# Patient Record
Sex: FEMALE | Race: WHITE | NOT HISPANIC OR LATINO | Employment: OTHER | URBAN - METROPOLITAN AREA
[De-identification: names, ages, dates, MRNs, and addresses within clinical notes are randomized per-mention and may not be internally consistent; named-entity substitution may affect disease eponyms.]

---

## 2019-07-20 ENCOUNTER — APPOINTMENT (EMERGENCY)
Dept: RADIOLOGY | Facility: HOSPITAL | Age: 74
End: 2019-07-20
Payer: COMMERCIAL

## 2019-07-20 ENCOUNTER — APPOINTMENT (OUTPATIENT)
Dept: ULTRASOUND IMAGING | Facility: HOSPITAL | Age: 74
End: 2019-07-20
Payer: COMMERCIAL

## 2019-07-20 ENCOUNTER — HOSPITAL ENCOUNTER (OUTPATIENT)
Facility: HOSPITAL | Age: 74
Setting detail: OBSERVATION
Discharge: HOME | End: 2019-07-21
Attending: EMERGENCY MEDICINE | Admitting: EMERGENCY MEDICINE
Payer: COMMERCIAL

## 2019-07-20 ENCOUNTER — APPOINTMENT (OUTPATIENT)
Dept: CT IMAGING | Facility: HOSPITAL | Age: 74
End: 2019-07-20
Payer: COMMERCIAL

## 2019-07-20 DIAGNOSIS — R11.2 NAUSEA AND VOMITING: ICD-10-CM

## 2019-07-20 DIAGNOSIS — R10.13 EPIGASTRIC PAIN: ICD-10-CM

## 2019-07-20 DIAGNOSIS — R55 SYNCOPE: Primary | ICD-10-CM

## 2019-07-20 PROBLEM — R07.9 CHEST PAIN: Status: ACTIVE | Noted: 2019-07-20

## 2019-07-20 PROBLEM — E78.5 HYPERLIPIDEMIA: Status: ACTIVE | Noted: 2019-07-20

## 2019-07-20 PROBLEM — I25.10 CAD (CORONARY ARTERY DISEASE): Status: ACTIVE | Noted: 2019-07-20

## 2019-07-20 LAB
ALBUMIN SERPL BCP-MCNC: 3.6 G/DL (ref 3.5–5)
ALP SERPL-CCNC: 97 U/L (ref 46–116)
ALT SERPL W P-5'-P-CCNC: 82 U/L (ref 12–78)
ANION GAP SERPL CALCULATED.3IONS-SCNC: 8 MMOL/L (ref 4–13)
AST SERPL W P-5'-P-CCNC: 43 U/L (ref 5–45)
BASOPHILS # BLD AUTO: 0.03 THOUSANDS/ΜL (ref 0–0.1)
BASOPHILS NFR BLD AUTO: 0 % (ref 0–1)
BILIRUB SERPL-MCNC: 0.4 MG/DL (ref 0.2–1)
BUN SERPL-MCNC: 11 MG/DL (ref 5–25)
CALCIUM SERPL-MCNC: 8.7 MG/DL (ref 8.3–10.1)
CHLORIDE SERPL-SCNC: 105 MMOL/L (ref 100–108)
CO2 SERPL-SCNC: 28 MMOL/L (ref 21–32)
CREAT SERPL-MCNC: 0.84 MG/DL (ref 0.6–1.3)
EOSINOPHIL # BLD AUTO: 0.34 THOUSAND/ΜL (ref 0–0.61)
EOSINOPHIL NFR BLD AUTO: 3 % (ref 0–6)
ERYTHROCYTE [DISTWIDTH] IN BLOOD BY AUTOMATED COUNT: 12.7 % (ref 11.6–15.1)
GFR SERPL CREATININE-BSD FRML MDRD: 70 ML/MIN/1.73SQ M
GLUCOSE SERPL-MCNC: 133 MG/DL (ref 65–140)
HCT VFR BLD AUTO: 42.1 % (ref 34.8–46.1)
HGB BLD-MCNC: 13.6 G/DL (ref 11.5–15.4)
IMM GRANULOCYTES # BLD AUTO: 0.04 THOUSAND/UL (ref 0–0.2)
IMM GRANULOCYTES NFR BLD AUTO: 0 % (ref 0–2)
LIPASE SERPL-CCNC: 330 U/L (ref 73–393)
LYMPHOCYTES # BLD AUTO: 1.06 THOUSANDS/ΜL (ref 0.6–4.47)
LYMPHOCYTES NFR BLD AUTO: 10 % (ref 14–44)
MCH RBC QN AUTO: 30.2 PG (ref 26.8–34.3)
MCHC RBC AUTO-ENTMCNC: 32.3 G/DL (ref 31.4–37.4)
MCV RBC AUTO: 94 FL (ref 82–98)
MONOCYTES # BLD AUTO: 0.71 THOUSAND/ΜL (ref 0.17–1.22)
MONOCYTES NFR BLD AUTO: 7 % (ref 4–12)
NEUTROPHILS # BLD AUTO: 8.67 THOUSANDS/ΜL (ref 1.85–7.62)
NEUTS SEG NFR BLD AUTO: 80 % (ref 43–75)
NRBC BLD AUTO-RTO: 0 /100 WBCS
PLATELET # BLD AUTO: 234 THOUSANDS/UL (ref 149–390)
PMV BLD AUTO: 10 FL (ref 8.9–12.7)
POTASSIUM SERPL-SCNC: 3.6 MMOL/L (ref 3.5–5.3)
PROT SERPL-MCNC: 7.2 G/DL (ref 6.4–8.2)
RBC # BLD AUTO: 4.5 MILLION/UL (ref 3.81–5.12)
SODIUM SERPL-SCNC: 141 MMOL/L (ref 136–145)
TROPONIN I SERPL-MCNC: <0.02 NG/ML
TSH SERPL DL<=0.05 MIU/L-ACNC: 2.57 UIU/ML (ref 0.36–3.74)
WBC # BLD AUTO: 10.85 THOUSAND/UL (ref 4.31–10.16)

## 2019-07-20 PROCEDURE — 80053 COMPREHEN METABOLIC PANEL: CPT

## 2019-07-20 PROCEDURE — 76705 ECHO EXAM OF ABDOMEN: CPT

## 2019-07-20 PROCEDURE — 85025 COMPLETE CBC W/AUTO DIFF WBC: CPT

## 2019-07-20 PROCEDURE — 99220 PR INITIAL OBSERVATION CARE/DAY 70 MINUTES: CPT | Performed by: INTERNAL MEDICINE

## 2019-07-20 PROCEDURE — 83690 ASSAY OF LIPASE: CPT | Performed by: EMERGENCY MEDICINE

## 2019-07-20 PROCEDURE — 93005 ELECTROCARDIOGRAM TRACING: CPT

## 2019-07-20 PROCEDURE — 71260 CT THORAX DX C+: CPT

## 2019-07-20 PROCEDURE — 84484 ASSAY OF TROPONIN QUANT: CPT | Performed by: PHYSICIAN ASSISTANT

## 2019-07-20 PROCEDURE — 99285 EMERGENCY DEPT VISIT HI MDM: CPT

## 2019-07-20 PROCEDURE — 71046 X-RAY EXAM CHEST 2 VIEWS: CPT

## 2019-07-20 PROCEDURE — 99284 EMERGENCY DEPT VISIT MOD MDM: CPT | Performed by: EMERGENCY MEDICINE

## 2019-07-20 PROCEDURE — 36415 COLL VENOUS BLD VENIPUNCTURE: CPT

## 2019-07-20 PROCEDURE — 84484 ASSAY OF TROPONIN QUANT: CPT

## 2019-07-20 PROCEDURE — 84443 ASSAY THYROID STIM HORMONE: CPT | Performed by: PHYSICIAN ASSISTANT

## 2019-07-20 RX ORDER — LEVOTHYROXINE SODIUM 0.1 MG/1
100 TABLET ORAL DAILY
Status: DISCONTINUED | OUTPATIENT
Start: 2019-07-20 | End: 2019-07-20

## 2019-07-20 RX ORDER — ACETAMINOPHEN 325 MG/1
650 TABLET ORAL EVERY 6 HOURS PRN
Status: DISCONTINUED | OUTPATIENT
Start: 2019-07-20 | End: 2019-07-21 | Stop reason: HOSPADM

## 2019-07-20 RX ORDER — SIMVASTATIN 40 MG
40 TABLET ORAL
COMMUNITY
End: 2021-04-18

## 2019-07-20 RX ORDER — ONDANSETRON 2 MG/ML
1 INJECTION INTRAMUSCULAR; INTRAVENOUS ONCE
Status: DISCONTINUED | OUTPATIENT
Start: 2019-07-20 | End: 2019-07-20

## 2019-07-20 RX ORDER — PANTOPRAZOLE SODIUM 40 MG/1
40 TABLET, DELAYED RELEASE ORAL
Status: DISCONTINUED | OUTPATIENT
Start: 2019-07-20 | End: 2019-07-21 | Stop reason: HOSPADM

## 2019-07-20 RX ORDER — ASPIRIN 81 MG/1
81 TABLET ORAL DAILY
Status: DISCONTINUED | OUTPATIENT
Start: 2019-07-20 | End: 2019-07-21 | Stop reason: HOSPADM

## 2019-07-20 RX ORDER — ASPIRIN 81 MG/1
81 TABLET, CHEWABLE ORAL DAILY
COMMUNITY

## 2019-07-20 RX ORDER — LEVOTHYROXINE SODIUM 0.12 MG/1
125 TABLET ORAL DAILY
COMMUNITY

## 2019-07-20 RX ORDER — LEVOTHYROXINE SODIUM 0.1 MG/1
100 TABLET ORAL DAILY
COMMUNITY
End: 2021-04-18

## 2019-07-20 RX ORDER — CLOPIDOGREL BISULFATE 75 MG/1
75 TABLET ORAL DAILY
COMMUNITY
End: 2021-04-18

## 2019-07-20 RX ORDER — CLOPIDOGREL BISULFATE 75 MG/1
75 TABLET ORAL DAILY
Status: DISCONTINUED | OUTPATIENT
Start: 2019-07-20 | End: 2019-07-21 | Stop reason: HOSPADM

## 2019-07-20 RX ORDER — PRAVASTATIN SODIUM 80 MG/1
80 TABLET ORAL
Status: DISCONTINUED | OUTPATIENT
Start: 2019-07-20 | End: 2019-07-21 | Stop reason: HOSPADM

## 2019-07-20 RX ORDER — IBUPROFEN 400 MG/1
400 TABLET ORAL EVERY 6 HOURS PRN
Status: DISCONTINUED | OUTPATIENT
Start: 2019-07-20 | End: 2019-07-21 | Stop reason: HOSPADM

## 2019-07-20 RX ADMIN — ACETAMINOPHEN 650 MG: 325 TABLET, FILM COATED ORAL at 13:04

## 2019-07-20 RX ADMIN — CLOPIDOGREL BISULFATE 75 MG: 75 TABLET ORAL at 09:52

## 2019-07-20 RX ADMIN — METOPROLOL TARTRATE 12.5 MG: 25 TABLET ORAL at 09:52

## 2019-07-20 RX ADMIN — PANTOPRAZOLE SODIUM 40 MG: 40 TABLET, DELAYED RELEASE ORAL at 13:04

## 2019-07-20 RX ADMIN — METOPROLOL TARTRATE 12.5 MG: 25 TABLET ORAL at 20:11

## 2019-07-20 RX ADMIN — IOHEXOL 100 ML: 350 INJECTION, SOLUTION INTRAVENOUS at 12:27

## 2019-07-20 RX ADMIN — ASPIRIN 81 MG: 81 TABLET, COATED ORAL at 09:52

## 2019-07-20 RX ADMIN — PRAVASTATIN SODIUM 80 MG: 80 TABLET ORAL at 17:06

## 2019-07-20 RX ADMIN — IBUPROFEN 400 MG: 400 TABLET, FILM COATED ORAL at 20:22

## 2019-07-20 RX ADMIN — ACETAMINOPHEN 650 MG: 325 TABLET, FILM COATED ORAL at 19:10

## 2019-07-20 NOTE — ASSESSMENT & PLAN NOTE
· JOSE placement x1 to the proximal LAD May 1, 2019 at outside hospital  · Patient does report compliance with aspirin, Plavix and statin  · On Toprol will verify dosing continue on admission

## 2019-07-20 NOTE — ED PROVIDER NOTES
History  Chief Complaint   Patient presents with    Chest Pain     Sudden onset of severe chest pressure at 3 AM   She has had some discomfort at 1 AM but was able to go back to sleep  68year old female presents for evaluation of epigastric tightness and pressure which awoke the patient from sleep at 3 am this morning  Patient reports similar epigastric tightness in a band-like distribution over the past week; however, symptoms had not been as severe as this morning  Patient states she was diaphoretic and nauseated when she awoke  She then had an unwitnessed syncopal episode  She denies shortness of breath  When she awoke, she called her brother who then called EMS  Patient had undergone cardiac catheterization with stent placement 4 months ago  She states she has been complaint with aspirin and plavix  Patient denies recent illness  Patient received 4 mg zofran by EMS in transit  History provided by:  Patient  Chest Pain   Pain location:  Epigastric  Pain quality: pressure and tightness    Pain radiates to:  Does not radiate  Pain radiates to the back: no    Pain severity:  Moderate  Onset quality:  Gradual  Duration:  1 week  Timing:  Constant  Progression:  Worsening  Chronicity:  New  Relieved by:  None tried  Worsened by:  Nothing tried  Ineffective treatments:  None tried  Associated symptoms: nausea, syncope and vomiting    Associated symptoms: no abdominal pain, no cough, no fever, no headache, no palpitations, no shortness of breath and no weakness    Nausea:     Severity:  Moderate    Onset quality:  Sudden    Duration:  3 hours    Timing:  Constant    Progression:  Improving  Syncope: Witnessed: no      Suspicion of head trauma:  No  Risk factors: coronary artery disease, high cholesterol and hypertension    Risk factors: no diabetes mellitus        Prior to Admission Medications   Prescriptions Last Dose Informant Patient Reported? Taking?    clopidogrel (PLAVIX) 75 mg tablet 7/19/2019 at Unknown time  Yes Yes   Sig: Take 75 mg by mouth daily   levothyroxine 100 mcg tablet 7/19/2019 at Unknown time  Yes Yes   Sig: Take 100 mcg by mouth daily   simvastatin (ZOCOR) 40 mg tablet 7/19/2019 at Unknown time  Yes Yes   Sig: Take 40 mg by mouth daily at bedtime      Facility-Administered Medications: None       Past Medical History:   Diagnosis Date    Cardiac disease     Disease of thyroid gland     Hyperlipidemia     Hypertension        Past Surgical History:   Procedure Laterality Date    CARDIAC SURGERY      Stent placement       History reviewed  No pertinent family history  I have reviewed and agree with the history as documented  Social History     Tobacco Use    Smoking status: Former Smoker    Smokeless tobacco: Never Used   Substance Use Topics    Alcohol use: Not Currently    Drug use: Never        Review of Systems   Constitutional: Negative for appetite change, chills and fever  HENT: Negative for congestion, rhinorrhea and sore throat  Respiratory: Negative for cough, chest tightness and shortness of breath  Cardiovascular: Positive for chest pain and syncope  Negative for palpitations and leg swelling  Gastrointestinal: Positive for nausea and vomiting  Negative for abdominal pain, constipation and diarrhea  Genitourinary: Negative for dysuria, frequency and hematuria  Musculoskeletal: Negative for myalgias, neck pain and neck stiffness  Skin: Negative for pallor  Neurological: Positive for syncope  Negative for weakness and headaches  All other systems reviewed and are negative  Physical Exam  Physical Exam   Constitutional: She is oriented to person, place, and time  She appears well-developed and well-nourished  Non-toxic appearance  No distress  HENT:   Head: Normocephalic and atraumatic  Eyes: Pupils are equal, round, and reactive to light  Conjunctivae and EOM are normal    Neck: Normal range of motion  Neck supple   No tracheal deviation present  No thyromegaly present  Cardiovascular: Normal rate, regular rhythm, normal heart sounds and intact distal pulses  Pulmonary/Chest: Effort normal and breath sounds normal    Abdominal: Soft  Bowel sounds are normal  She exhibits no distension  There is no tenderness  Lymphadenopathy:     She has no cervical adenopathy  Neurological: She is alert and oriented to person, place, and time  Skin: Skin is warm and dry  She is not diaphoretic  Nursing note and vitals reviewed        Vital Signs  ED Triage Vitals [07/20/19 0519]   Temperature Pulse Respirations Blood Pressure SpO2   97 7 °F (36 5 °C) 62 22 108/57 98 %      Temp Source Heart Rate Source Patient Position - Orthostatic VS BP Location FiO2 (%)   Oral Monitor Sitting Right arm --      Pain Score       No Pain           Vitals:    07/20/19 0519 07/20/19 0530 07/20/19 0615 07/20/19 0645   BP: 108/57 109/64 115/56 130/61   Pulse: 62 69 82 82   Patient Position - Orthostatic VS: Sitting            Visual Acuity      ED Medications  Medications   ondansetron (FOR EMS ONLY) (ZOFRAN) 4 mg/2 mL injection 4 mg (has no administration in time range)   aspirin (ECOTRIN LOW STRENGTH) EC tablet 81 mg (has no administration in time range)       Diagnostic Studies  Results Reviewed     Procedure Component Value Units Date/Time    Troponin I [[id number]]  (Normal) Collected:  07/20/19 0528    Lab Status:  Final result Specimen:  Blood from Arm, Left Updated:  07/20/19 0605     Troponin I <0 02 ng/mL     Comprehensive metabolic panel [[id number]]  (Abnormal) Collected:  07/20/19 0528    Lab Status:  Final result Specimen:  Blood from Arm, Left Updated:  07/20/19 0604     Sodium 141 mmol/L      Potassium 3 6 mmol/L      Chloride 105 mmol/L      CO2 28 mmol/L      ANION GAP 8 mmol/L      BUN 11 mg/dL      Creatinine 0 84 mg/dL      Glucose 133 mg/dL      Calcium 8 7 mg/dL      AST 43 U/L      ALT 82 U/L      Alkaline Phosphatase 97 U/L      Total Protein 7 2 g/dL Albumin 3 6 g/dL      Total Bilirubin 0 40 mg/dL      eGFR 70 ml/min/1 73sq m     Narrative:       National Kidney Disease Foundation guidelines for Chronic Kidney Disease (CKD):     Stage 1 with normal or high GFR (GFR > 90 mL/min/1 73 square meters)    Stage 2 Mild CKD (GFR = 60-89 mL/min/1 73 square meters)    Stage 3A Moderate CKD (GFR = 45-59 mL/min/1 73 square meters)    Stage 3B Moderate CKD (GFR = 30-44 mL/min/1 73 square meters)    Stage 4 Severe CKD (GFR = 15-29 mL/min/1 73 square meters)    Stage 5 End Stage CKD (GFR <15 mL/min/1 73 square meters)  Note: GFR calculation is accurate only with a steady state creatinine    Lipase [[id number]]  (Normal) Collected:  07/20/19 0528    Lab Status:  Final result Specimen:  Blood from Arm, Left Updated:  07/20/19 0604     Lipase 330 u/L     CBC and differential [[id number]]  (Abnormal) Collected:  07/20/19 0528    Lab Status:  Final result Specimen:  Blood from Arm, Left Updated:  07/20/19 0541     WBC 10 85 Thousand/uL      RBC 4 50 Million/uL      Hemoglobin 13 6 g/dL      Hematocrit 42 1 %      MCV 94 fL      MCH 30 2 pg      MCHC 32 3 g/dL      RDW 12 7 %      MPV 10 0 fL      Platelets 307 Thousands/uL      nRBC 0 /100 WBCs      Neutrophils Relative 80 %      Immat GRANS % 0 %      Lymphocytes Relative 10 %      Monocytes Relative 7 %      Eosinophils Relative 3 %      Basophils Relative 0 %      Neutrophils Absolute 8 67 Thousands/µL      Immature Grans Absolute 0 04 Thousand/uL      Lymphocytes Absolute 1 06 Thousands/µL      Monocytes Absolute 0 71 Thousand/µL      Eosinophils Absolute 0 34 Thousand/µL      Basophils Absolute 0 03 Thousands/µL                  XR chest 2 views   ED Interpretation by Tom Kenny MD (07/20 2221)   No acute pulmonary pathology                 Procedures  ECG 12 Lead Documentation Only  Date/Time: 7/20/2019 5:15 AM  Performed by: Tom Kenny MD  Authorized by: Tom Kenny MD     Indications / Diagnosis: Epigastric pain  ECG reviewed by me, the ED Provider: yes    Patient location:  ED  Previous ECG:     Previous ECG:  Unavailable  Interpretation:     Interpretation: non-specific    Rate:     ECG rate:  63    ECG rate assessment: normal    Rhythm:     Rhythm: sinus rhythm    Ectopy:     Ectopy: none    QRS:     QRS axis:  Normal    QRS intervals:  Normal  Conduction:     Conduction: normal    ST segments:     ST segments:  Normal  T waves:     T waves: flattening and inverted      Flattening:  AVL, III and V4    Inverted:  V2 and V3           ED Course         HEART Risk Score      Most Recent Value   History  1 Filed at: 07/20/2019 0609   ECG  1 Filed at: 07/20/2019 0546   Age  2 Filed at: 07/20/2019 1046   Risk Factors  2 Filed at: 07/20/2019 8749   Troponin  0 Filed at: 07/20/2019 1458   Heart Score Risk Calculator   History  1 Filed at: 07/20/2019 0609   ECG  1 Filed at: 07/20/2019 2025   Age  2 Filed at: 07/20/2019 6935   Risk Factors  2 Filed at: 07/20/2019 5594   Troponin  0 Filed at: 07/20/2019 0722   HEART Score  6 Filed at: 07/20/2019 0058   HEART Score  6 Filed at: 07/20/2019 0609                            MDM  Number of Diagnoses or Management Options  Epigastric pain: new and requires workup  Nausea and vomiting: new and requires workup  Syncope: new and requires workup  Diagnosis management comments: 68year old female presents with epigastric pain, diaphoresis, nausea, vomiting and syncopal episode since 3 am  Stent placed 4 months ago  On aspirin and plavix  EKG nonspecific  HEART score 6  Initial troponin negative  Patient admitted for further evaluation and management          Amount and/or Complexity of Data Reviewed  Clinical lab tests: ordered and reviewed  Tests in the radiology section of CPT®: ordered  Independent visualization of images, tracings, or specimens: yes    Patient Progress  Patient progress: stable      Disposition  Final diagnoses:   Epigastric pain   Syncope   Nausea and vomiting     Time reflects when diagnosis was documented in both MDM as applicable and the Disposition within this note     Time User Action Codes Description Comment    7/20/2019  6:14 AM Majel Reap, Marga Merry Add [R10 13] Epigastric pain     7/20/2019  6:15 AM Majel Reap, Marga Merry Add [R55] Syncope     7/20/2019  6:15 AM Majel Reap, Marga Merry Add [R11 2] Nausea and vomiting     7/20/2019  6:15 AM Adam Oh Modify [R10 13] Epigastric pain     7/20/2019  6:15 AM Adam Oh Modify [R55] Syncope       ED Disposition     ED Disposition Condition Date/Time Comment    Admit Stable Sat Jul 20, 2019  6:38 AM Case was discussed with REGULO and the patient's admission status was agreed to be Admission Status: observation status to the service of Dr Maxine Davis   Follow-up Information    None         Patient's Medications   Discharge Prescriptions    No medications on file     No discharge procedures on file      ED Provider  Electronically Signed by           Afsaneh Borrero MD  07/20/19 5953

## 2019-07-20 NOTE — ASSESSMENT & PLAN NOTE
· History of CAD with JOSE x1 on May 1, 2019 to the proximal LAD at an outside hospital  · Continue telemetry as well as hemodynamic monitoring  · Continue Plavix, atorvastatin and aspirin  · Initial troponin 0 02 will trend x2 with repeat EKGs at time of repeat troponin  · As pain does appear about the lower chest/right upper quadrant to mid epigastric region lipase was obtained and normal   LFTs are normal   Should consider a right upper quadrant ultrasound  · Consider cardiology consultation in setting of drug-eluting stent placement 2 months ago  Patient does report compliant with medication regimen since drug eluting stent placement

## 2019-07-20 NOTE — H&P
H&P- Leonidas Nguyen 1945, 68 y o  female MRN: [id number]    Unit/Bed#: ED 05 Encounter: [id number]    Primary Care Provider: Aditi Martinez   Date and time admitted to hospital: 7/20/2019  5:21 AM        * Chest pain  Assessment & Plan  · History of CAD with JOSE x1 on May 1, 2019 to the proximal LAD at an outside hospital  · Continue telemetry as well as hemodynamic monitoring  · Continue Plavix, atorvastatin and aspirin  · Initial troponin 0 02 will trend x2 with repeat EKGs at time of repeat troponin  · As pain does appear about the lower chest/right upper quadrant to mid epigastric region lipase was obtained and normal   LFTs are normal   Should consider a right upper quadrant ultrasound  · Consider cardiology consultation in setting of drug-eluting stent placement 2 months ago  Patient does report compliant with medication regimen since drug eluting stent placement  Epigastric pain  Assessment & Plan  · Consider other diagnoses within differential including cardiac etiology but also abdominal etiology  · LFTs are normal as is lipase  · Patient at this time is pain free consider right upper quadrant ultrasound  · Patient does report pain appears to improve with taking over the counter PPI such as Prevacid    Hyperlipidemia  Assessment & Plan  Continue statin    CAD (coronary artery disease)  Assessment & Plan  · JOSE placement x1 to the proximal LAD May 1, 2019 at outside hospital  · Patient does report compliance with aspirin, Plavix and statin  · On Toprol will verify dosing continue on admission      VTE Prophylaxis: Low Risk  / sequential compression device   Code Status: Level 1 - Full code  POLST: POLST form is not discussed and not completed at this time  Discussion with family: Discussed goals of care and reason for admission with patient    Anticipated Length of Stay:  Patient will be admitted on an Observation basis with an anticipated length of stay of  > 2 midnights  Justification for Hospital Stay: Chest Pain / Presumed Syncope    Total Time for Visit, including Counseling / Coordination of Care: 40 Minutes  Greater than 50% of this total time spent on direct patient counseling and coordination of care  Chief Complaint:   "I had pain and I passed out"    History of Present Illness:    Harper Garrett is a 68 y o  female who presented to [facility] emergency department via EMS with the chief complaint of lower chest as well as midepigastric chest discomfort which patient reports initially woke up approximately 0100 hours this a m  [facility] She reports that she was able to fall back to sleep however was again awoke around 0300 with similar type pain which she describes as stabbing, intense, in a band like distribution about her upper abdomen/lower chest   Patient states pain has been intermittent over the course of the past 1-2 weeks with varying intensities however does appear to improve if she takes Prevacid  This a m  She reports upon awakening a she remembers everything going black"  She advised that she was able to get herself to the floor however does believe that she did pass out  Associated with this pain today she does report sweating as well as dizziness prior to the described event  Patient reports a past medical history of coronary artery disease with a drug-eluting stent which was placed may of this year to her proximal LAD at an outside facility  Additional history does include thyroid disease  She reports compliance with all medications  She denies any recent illness, fevers, chills or associated shortness of breath however does report an episode of nausea and vomiting     9-1-1 called this am   Patient was administered Zofran  Work up in [facility] without acute findings  Presented to Medical team for admission  Review of Systems:    Review of Systems   Constitutional: Positive for diaphoresis   Negative for activity change, appetite change, chills, fatigue, fever and unexpected weight change  HENT: Negative  Eyes: Negative  Respiratory: Negative for apnea, chest tightness, shortness of breath and wheezing  Cardiovascular: Positive for chest pain  Negative for palpitations and leg swelling  Gastrointestinal: Positive for abdominal pain, nausea and vomiting  Negative for abdominal distention  Endocrine: Negative  Genitourinary: Negative for dysuria, frequency and urgency  Musculoskeletal: Negative for back pain and neck pain  Skin: Negative  Allergic/Immunologic: Negative  Neurological: Positive for dizziness and syncope  Negative for speech difficulty, light-headedness, numbness and headaches  Hematological: Negative  Psychiatric/Behavioral: Negative  Past Medical and Surgical History:     Past Medical History:   Diagnosis Date    Cardiac disease     Disease of thyroid gland     Hyperlipidemia     Hypertension        Past Surgical History:   Procedure Laterality Date    CARDIAC SURGERY      Stent placement       Meds/Allergies:    Prior to Admission medications    Medication Sig Start Date End Date Taking? Authorizing Provider   clopidogrel (PLAVIX) 75 mg tablet Take 75 mg by mouth daily   Yes Historical Provider, MD   levothyroxine 100 mcg tablet Take 100 mcg by mouth daily   Yes Historical Provider, MD   simvastatin (ZOCOR) 40 mg tablet Take 40 mg by mouth daily at bedtime   Yes Historical Provider, MD     I have reviewed home medications with patient personally  Allergies:  Allergies no known allergies    Social History:     Marital Status: Single   Occupation: Retired  Patient Pre-hospital Living Situation: Lives in [place], visiting this area  Patient Pre-hospital Level of Mobility: Fully mobile, performs own ADLs  Patient Pre-hospital Diet Restrictions: None  Substance Use History:   Social History     Substance and Sexual Activity   Alcohol Use Not Currently     Social History     Tobacco Use Smoking Status Former Smoker   Smokeless Tobacco Never Used     Social History     Substance and Sexual Activity   Drug Use Never       Family History:    non-contributory    Physical Exam:     Vitals:   Blood Pressure: 130/61 (07/20/19 0645)  Pulse: 82 (07/20/19 0645)  Temperature: 97 7 °F (36 5 °C) (07/20/19 0519)  Temp Source: Oral (07/20/19 0519)  Respirations: (!) 24 (07/20/19 0645)  Height: 5' 4" (162 6 cm) (07/20/19 0519)  Weight - Scale: 59 kg (130 lb) (07/20/19 0519)  SpO2: 97 % (07/20/19 0645)    Physical Exam    General: 69 y/o F in ER bed room 5  She is awake, alert, and oriented, non-toxic appearing, quite pleasant  HEENT: Normocephalic, atraumatic  PERRL, EOMI, sclera anicteric, conjunctiva pink, oropharynx patent, mucous membranes moist, tolerating secretions  Neck: Supple, trachea midline  Heart: RRR without murmur, rub, or gallop, no pain to palpation of the anterior chest wall or Upper abdomen at this time  Abd: Soft, non-tender, no guarding, rebound, or peritoneal signs, active BS noted  No murphys sign  : no yates  Back: No CVA tenderness  Ext: FROM of upper and lower extremities, distal pulses intact  Neuro: GCS 15, conscious, alert, and oriented, no focal exam    Additional Data:     Lab Results: I have personally reviewed pertinent reports     and I have personally reviewed pertinent films in PACS    Results from last 7 days   Lab Units 07/20/19  0528   WBC Thousand/uL 10 85*   HEMOGLOBIN g/dL 13 6   HEMATOCRIT % 42 1   PLATELETS Thousands/uL 234   NEUTROS PCT % 80*   LYMPHS PCT % 10*   MONOS PCT % 7   EOS PCT % 3     Results from last 7 days   Lab Units 07/20/19  0528   SODIUM mmol/L 141   POTASSIUM mmol/L 3 6   CHLORIDE mmol/L 105   CO2 mmol/L 28   BUN mg/dL 11   CREATININE mg/dL 0 84   ANION GAP mmol/L 8   CALCIUM mg/dL 8 7   ALBUMIN g/dL 3 6   TOTAL BILIRUBIN mg/dL 0 40   ALK PHOS U/L 97   ALT U/L 82*   AST U/L 43   GLUCOSE RANDOM mg/dL 133           Imaging: I have personally reviewed pertinent films in PACS  CXR without active cardiopulmonary disease    XR chest 2 views   ED Interpretation by Gregory Huerta MD ( [id number])   No acute pulmonary pathology          EKG, Pathology, and Other Studies Reviewed on Admission:   · EK lead EKG reviewed, no ST Segment elevation, noted non-specific TWave abnormalities/invertion    Allscripts / Epic Records Reviewed: Yes     ** Please Note: This note has been constructed using a voice recognition system   **

## 2019-07-20 NOTE — ASSESSMENT & PLAN NOTE
· Consider other diagnoses within differential including cardiac etiology but also abdominal etiology  · LFTs are normal as is lipase  · Patient at this time is pain free consider right upper quadrant ultrasound    · Patient does report pain appears to improve with taking over the counter PPI such as Prevacid

## 2019-07-21 VITALS
RESPIRATION RATE: 17 BRPM | TEMPERATURE: 98.4 F | HEART RATE: 64 BPM | HEIGHT: 64 IN | DIASTOLIC BLOOD PRESSURE: 64 MMHG | OXYGEN SATURATION: 95 % | WEIGHT: 130 LBS | SYSTOLIC BLOOD PRESSURE: 116 MMHG | BODY MASS INDEX: 22.2 KG/M2

## 2019-07-21 PROBLEM — R10.13 EPIGASTRIC PAIN: Status: RESOLVED | Noted: 2019-07-20 | Resolved: 2019-07-21

## 2019-07-21 PROBLEM — R07.9 CHEST PAIN: Status: RESOLVED | Noted: 2019-07-20 | Resolved: 2019-07-21

## 2019-07-21 LAB
ALBUMIN SERPL BCP-MCNC: 3.1 G/DL (ref 3.5–5)
ALP SERPL-CCNC: 83 U/L (ref 46–116)
ALT SERPL W P-5'-P-CCNC: 79 U/L (ref 12–78)
ANION GAP SERPL CALCULATED.3IONS-SCNC: 5 MMOL/L (ref 4–13)
AST SERPL W P-5'-P-CCNC: 45 U/L (ref 5–45)
BASOPHILS # BLD AUTO: 0.04 THOUSANDS/ΜL (ref 0–0.1)
BASOPHILS NFR BLD AUTO: 1 % (ref 0–1)
BILIRUB SERPL-MCNC: 0.4 MG/DL (ref 0.2–1)
BUN SERPL-MCNC: 7 MG/DL (ref 5–25)
CALCIUM SERPL-MCNC: 8.5 MG/DL (ref 8.3–10.1)
CHLORIDE SERPL-SCNC: 99 MMOL/L (ref 100–108)
CO2 SERPL-SCNC: 29 MMOL/L (ref 21–32)
CREAT SERPL-MCNC: 0.63 MG/DL (ref 0.6–1.3)
EOSINOPHIL # BLD AUTO: 0.29 THOUSAND/ΜL (ref 0–0.61)
EOSINOPHIL NFR BLD AUTO: 4 % (ref 0–6)
ERYTHROCYTE [DISTWIDTH] IN BLOOD BY AUTOMATED COUNT: 12.5 % (ref 11.6–15.1)
GFR SERPL CREATININE-BSD FRML MDRD: 90 ML/MIN/1.73SQ M
GLUCOSE SERPL-MCNC: 102 MG/DL (ref 65–140)
HCT VFR BLD AUTO: 37.7 % (ref 34.8–46.1)
HGB BLD-MCNC: 12.2 G/DL (ref 11.5–15.4)
IMM GRANULOCYTES # BLD AUTO: 0.02 THOUSAND/UL (ref 0–0.2)
IMM GRANULOCYTES NFR BLD AUTO: 0 % (ref 0–2)
LIPASE SERPL-CCNC: 207 U/L (ref 73–393)
LYMPHOCYTES # BLD AUTO: 1.05 THOUSANDS/ΜL (ref 0.6–4.47)
LYMPHOCYTES NFR BLD AUTO: 15 % (ref 14–44)
MAGNESIUM SERPL-MCNC: 1.9 MG/DL (ref 1.6–2.6)
MCH RBC QN AUTO: 30.1 PG (ref 26.8–34.3)
MCHC RBC AUTO-ENTMCNC: 32.4 G/DL (ref 31.4–37.4)
MCV RBC AUTO: 93 FL (ref 82–98)
MONOCYTES # BLD AUTO: 0.59 THOUSAND/ΜL (ref 0.17–1.22)
MONOCYTES NFR BLD AUTO: 9 % (ref 4–12)
NEUTROPHILS # BLD AUTO: 4.89 THOUSANDS/ΜL (ref 1.85–7.62)
NEUTS SEG NFR BLD AUTO: 71 % (ref 43–75)
NRBC BLD AUTO-RTO: 0 /100 WBCS
PLATELET # BLD AUTO: 185 THOUSANDS/UL (ref 149–390)
PMV BLD AUTO: 9.9 FL (ref 8.9–12.7)
POTASSIUM SERPL-SCNC: 3.8 MMOL/L (ref 3.5–5.3)
PROT SERPL-MCNC: 6.2 G/DL (ref 6.4–8.2)
RBC # BLD AUTO: 4.05 MILLION/UL (ref 3.81–5.12)
SODIUM SERPL-SCNC: 133 MMOL/L (ref 136–145)
WBC # BLD AUTO: 6.88 THOUSAND/UL (ref 4.31–10.16)

## 2019-07-21 PROCEDURE — 83735 ASSAY OF MAGNESIUM: CPT | Performed by: INTERNAL MEDICINE

## 2019-07-21 PROCEDURE — 80053 COMPREHEN METABOLIC PANEL: CPT | Performed by: INTERNAL MEDICINE

## 2019-07-21 PROCEDURE — 99217 PR OBSERVATION CARE DISCHARGE MANAGEMENT: CPT | Performed by: INTERNAL MEDICINE

## 2019-07-21 PROCEDURE — 85025 COMPLETE CBC W/AUTO DIFF WBC: CPT | Performed by: INTERNAL MEDICINE

## 2019-07-21 PROCEDURE — 83690 ASSAY OF LIPASE: CPT | Performed by: INTERNAL MEDICINE

## 2019-07-21 RX ORDER — PANTOPRAZOLE SODIUM 40 MG/1
40 TABLET, DELAYED RELEASE ORAL
Qty: 30 TABLET | Refills: 0 | Status: SHIPPED | OUTPATIENT
Start: 2019-07-22 | End: 2021-04-18

## 2019-07-21 RX ORDER — PANTOPRAZOLE SODIUM 40 MG/1
40 TABLET, DELAYED RELEASE ORAL
Qty: 30 TABLET | Refills: 0 | Status: SHIPPED | OUTPATIENT
Start: 2019-07-22 | End: 2019-07-21

## 2019-07-21 RX ADMIN — LEVOTHYROXINE SODIUM 125 MCG: 100 TABLET ORAL at 06:09

## 2019-07-21 RX ADMIN — METOPROLOL TARTRATE 12.5 MG: 25 TABLET ORAL at 08:20

## 2019-07-21 RX ADMIN — CLOPIDOGREL BISULFATE 75 MG: 75 TABLET ORAL at 08:20

## 2019-07-21 RX ADMIN — PANTOPRAZOLE SODIUM 40 MG: 40 TABLET, DELAYED RELEASE ORAL at 06:09

## 2019-07-21 RX ADMIN — ASPIRIN 81 MG: 81 TABLET, COATED ORAL at 08:19

## 2019-07-21 NOTE — DISCHARGE INSTRUCTIONS
Follow-up with PCP in 1 week  Repeat basic metabolic panel in 1 week  Outpatient follow-up with GI and [facility] HIDA scan as outpatient  Return to ER with any worsening chest pain, shortness of breath, abdominal pain, nausea, vomiting or any other alarming symptoms

## 2019-07-21 NOTE — ASSESSMENT & PLAN NOTE
· History of CAD with JOSE x1 on May 1, 2019 to the proximal LAD at an outside hospital  · Continue telemetry as well as hemodynamic monitoring  · Continue Plavix, atorvastatin and aspirin  · Troponins were negative  · EKG showed no ischemic changes  · Patient remained chest pain-free    Continue outpatient follow-up with her cardiologist in ER

## 2019-07-21 NOTE — ASSESSMENT & PLAN NOTE
· Consider other diagnoses within differential including cardiac etiology but also abdominal etiology  · LFTs are normal as is lipase  · Patient does report pain appears to improve with taking over the counter PPI such as Prevacid  · Right upper quadrant ultrasound was unremarkable  · Patient had CT chest with contrast which showed no mass  · Patient is chest pain-free  · She wants to go home    Patient will be recommended to to [facility]CK HIDA scan as outpatient and repeat BMP in 1 week

## 2019-07-21 NOTE — UTILIZATION REVIEW
Initial Clinical Review    Admission: Date/Time/Statement: OBSERVATION 7/20/19 @ 0638    Orders Placed This Encounter   Procedures    Place in Observation (expected length of stay for this patient is less than two midnights)     Standing Status:   Standing     Number of Occurrences:   1     Order Specific Question:   Admitting Physician     Answer:   Daquan Morales [[id number]]     Order Specific Question:   Level of Care     Answer:   Med Surg [[id number]]     ED Arrival Information     Expected Arrival Acuity Means of Arrival Escorted By Service Admission Type    - 7/20/2019 05:19 [facility]) General Medicine Emergency    Arrival Complaint    -        Chief Complaint   Patient presents with    Chest Pain     Sudden onset of severe chest pressure at 3 AM   She has had some discomfort at 1 AM but was able to go back to sleep  Assessment/Plan:   Broderick Mendez is a 68 y o  female who presented to [address] [place] [address] emergency department via EMS with the chief complaint of lower chest as well as midepigastric chest discomfort which patient reports initially woke up approximately 0100 hours this a m  Shae Lightning She reports that she was able to fall back to sleep however was again awoke around 0300 with similar type pain which she describes as stabbing, intense, in a band like distribution about her upper abdomen/lower chest   Patient states pain has been intermittent over the course of the past 1-2 weeks with varying intensities however does appear to improve if she takes Prevacid  This a m  She reports upon awakening a she remembers everything going black"  She advised that she was able to get herself to the floor however does believe that she did pass out  Associated with this pain today she does report sweating as well as dizziness prior to the described event      Patient reports a past medical history of coronary artery disease with a drug-eluting stent which was placed may of this year to her proximal LAD at an outside facility  Additional history does inclu de thyroid disease  She reports compliance with all medications  She denies any recent illness, fevers, chills or associated shortness of breath however does report an episode of nausea and vomiting    9-1-1 called this am   Patient was administered Zofran  Work up in [facility], Labs without acute findings  Presented to Medical team for admission to observation status  * Chest pain  Assessment & Plan  · History of CAD with JOSE x1 on May 1, 2019 to the proximal LAD at an outside hospital  · Continue telemetry as well as hemodynamic monitoring  · Continue Plavix, atorvastatin and aspirin  · Initial troponin 0 02 will trend x2 with repeat EKGs at time of repeat troponin  · As pain does appear about the lower chest/right upper quadrant to mid epigastric region lipase was obtained and normal   LFTs are normal   Should consider a right upper quadrant ultrasound  · Consider cardiology consultation in setting of drug-eluting stent placement 2 months ago  Patient does report compliant with medication regimen since drug eluting stent placement      Epigastric pain  Assessment & Plan  · Consider other diagnoses within differential including cardiac etiology but also abdominal etiology  · LFTs are normal as is lipase  · Patient at this time is pain free consider right upper quadrant ultrasound    · Patient does report pain appears to improve with taking over the counter PPI such as Prevacid     Hyperlipidemia  Assessment & Plan  Continue statin     CAD (coronary artery disease)  Assessment & Plan  · JOSE placement x1 to the proximal LAD May 1, 2019 at outside hospital  · Patient does report compliance with aspirin, Plavix and statin  · On Toprol will verify dosing continue on admission      VTE Prophylaxis: Low Risk  / sequential compression device   Code Status: Level 1 - Full code  Anticipated Length of Stay:  Patient will be admitted on an Observation basis with an anticipated length of stay of  > 2 midnights  Justification for Hospital Stay: Chest Pain / Presumed Syncope     ED Triage Vitals [07/20/19 0519]   Temperature Pulse Respirations Blood Pressure SpO2   97 7 °F (36 5 °C) 62 22 108/57 98 %      Temp Source Heart Rate Source Patient Position - Orthostatic VS BP Location FiO2 (%)   Oral Monitor Sitting Right arm --      Pain Score       No Pain        Wt Readings from Last 1 Encounters:   07/20/19 59 kg (130 lb)     Additional Vital Signs:   07/21/19 0738  98 4 °F (36 9 °C)  64  17  116/64    95 %  None (Room air)   07/20/19 2305        108/56  76       07/20/19 2300  98 °F (36 7 °C)    20  92/51  69       07/20/19 2011    78    122/60         07/20/19 1500  99 8 °F (37 7 °C)  93  18  114/60    95 %  None (Room air)   07/20/19 0815  98 8 °F (37 1 °C)  79  18  110/64  80  96 %      07/20/19 0730    73  18  115/56    96 %  None (Room air)   07/20/19 0715    74  17  106/56  79  96 %     07/20/19 0700    78  18      97 %     07/20/19 0645    82  24Abnormal   130/61    97 %     07/20/19 0630    74  22  128/61  88  97 %     07/20/19 0615    82  22  115/56    97 %     07/20/19 0530    69  20  109/64    96 %       Pertinent Labs/Diagnostic Test Results:     7/20 CXR - Chest x-ray abnormality corresponds to linear scarring in the left lower lobe  No mass      7/20 IS RUQ - Negative Amor's sign  Simple right lower pole cyst   Trace ascites      7/20 CXR - Left lower lobe process, consider CT scan of chest with contrast      7/20 ECG - SR, FLATTENING OF T WAVES IN aVL, III, V4 AND INVERTED T WAVES IN V2, V3    Results from last 7 days   Lab Units 07/21/19  0539 07/20/19  0528   WBC Thousand/uL 6 88 10 85*   HEMOGLOBIN g/dL 12 2 13 6   HEMATOCRIT % 37 7 42 1   PLATELETS Thousands/uL 185 234   NEUTROS ABS Thousands/µL 4 89 8 67*     Results from last 7 days   Lab Units 07/21/19  0539 07/20/19  0528   SODIUM mmol/L 133* 141 POTASSIUM mmol/L 3 8 3 6   CHLORIDE mmol/L 99* 105   CO2 mmol/L 29 28   ANION GAP mmol/L 5 8   BUN mg/dL 7 11   CREATININE mg/dL 0 63 0 84   EGFR ml/min/1 73sq m 90 70   CALCIUM mg/dL 8 5 8 7   MAGNESIUM mg/dL 1 9  --      Results from last 7 days   Lab Units 07/21/19  0539 07/20/19  0528   AST U/L 45 43   ALT U/L 79* 82*   ALK PHOS U/L 83 97   TOTAL PROTEIN g/dL 6 2* 7 2   ALBUMIN g/dL 3 1* 3 6   TOTAL BILIRUBIN mg/dL 0 40 0 40         Results from last 7 days   Lab Units 07/21/19  0539 07/20/19  0528   GLUCOSE RANDOM mg/dL 102 133     Results from last 7 days   Lab Units 07/20/19  1338 07/20/19  0921 07/20/19  0528   TROPONIN I ng/mL <0 02 <0 02 <0 02     Results from last 7 days   Lab Units 07/21/19  0539 07/20/19  0528   LIPASE u/L 207 330     ED Treatment:   Medication Administration from 07/20/2019 0519 to 07/20/2019 0815       Date/Time Order Dose Route Action     07/20/2019 0545 ondansetron (FOR EMS ONLY) (ZOFRAN) 4 mg/2 mL injection 4 mg 0 mg Does not apply Hold        Past Medical History:   Diagnosis Date    Cardiac disease     Disease of thyroid gland     Hyperlipidemia     Hypertension      Present on Admission:   (Resolved) Chest pain   (Resolved) Epigastric pain   CAD (coronary artery disease)   Hyperlipidemia    Admitting Diagnosis: Syncope [R55]  Epigastric pain [R10 13]  Chest pain [R07 9]  Nausea and vomiting [R11 2]     Age/Sex: 68 y o  female     Admission Orders:    Current Facility-Administered Medications:  acetaminophen 650 mg Oral Q6H PRN X2 7/20   aspirin 81 mg Oral Daily    clopidogrel 75 mg Oral Daily    ibuprofen 400 mg Oral Q6H PRN X1 7/20   levothyroxine 125 mcg Oral Daily    metoprolol tartrate 12 5 mg Oral Q12H MIGUEL    pantoprazole 40 mg Oral Early Morning    pravastatin 80 mg Oral Daily With Dinner      TELE  SCDs  AMBULATE Q SHIFT   UP W/ ASSIST   CARDIAC DIET     Network Utilization Review Department  Phone: [phone];  Fax [phone]  [email or web address]  ATTENTION: Please call with any questions or concerns to [phone]  and carefully listen to the prompts so that you are directed to the right person  Send all requests for admission clinical reviews, approved or denied determinations and any other requests to fax [phone]   All voicemails are confidential

## 2019-07-21 NOTE — DISCHARGE SUMMARY
Discharge- Leonidas Nguyen 1945, 68 y o  female MRN: [id number]    Unit/Bed#: [address] [place] [address] Encounter: [id number]    Primary Care Provider: Aditi Martinez   Date and time admitted to hospital: 7/20/2019  5:21 AM        Hyperlipidemia  Assessment & Plan  Continue statin    CAD (coronary artery disease)  Assessment & Plan  · JOSE placement x1 to the proximal LAD May 1, 2019 at outside hospital  · Patient does report compliance with aspirin, Plavix and statin  · Continue on medical management    Epigastric pain  Assessment & Plan  · Consider other diagnoses within differential including cardiac etiology but also abdominal etiology  · LFTs are normal as is lipase  · Patient does report pain appears to improve with taking over the counter PPI such as Prevacid  · Right upper quadrant ultrasound was unremarkable  · Patient had CT chest with contrast which showed no mass  · Patient is chest pain-free  · She wants to go home  Patient will be recommended to to [facility] HIDA scan as outpatient and repeat BMP in 1 week    * Chest pain  Assessment & Plan  · History of CAD with JOSE x1 on May 1, 2019 to the proximal LAD at an outside hospital  · Continue telemetry as well as hemodynamic monitoring  · Continue Plavix, atorvastatin and aspirin  · Troponins were negative  · EKG showed no ischemic changes  · Patient remained chest pain-free  Continue outpatient follow-up with her cardiologist in [address] [place] [address] Course:     Leonidas Nguyen is a 68 y o  female patient who originally presented to the hospital on   Admission Orders (From admission, onward)    Ordered        07/20/19 0638  Place in Observation (expected length of stay for this patient is less than two midnights)  Once            due to right upper quadrant abdominal pain/right lower chest pain radiating like a band to the left side  Patient had right upper quadrant ultrasound done which was unremarkable    Patient had serial troponins which were all negative lipase was normal   Had a CT chest with contrast done which showed no mass  Patient right upper quadrant pain has resolved  She wants to go home  She is recommended to follow up with GI as outpatient and to [facility] HIDA scan as outpatient  Patient is hemodynamically stable for discharge  Follow-up with PCP in 1 week  Repeat basic metabolic panel in 1 week  Outpatient follow-up with GI and [facility] HIDA scan as outpatient  Return to ER with any worsening chest pain, shortness of breath, abdominal pain, nausea, vomiting or any other alarming symptoms    Please see above list of diagnoses and related plan for additional information  Condition at Discharge:  good      Discharge instructions/Information to patient and family:   See after visit summary for information provided to patient and family  Provisions for Follow-Up Care:  See after visit summary for information related to follow-up care and any pertinent home health orders  Disposition:     Home       Discharge Statement:  I spent 35 minutes discharging the patient  This time was spent on the day of discharge  I had direct contact with the patient on the day of discharge  Greater than 50% of the total time was spent examining patient, answering all patient questions, arranging and discussing plan of care with patient as well as directly providing post-discharge instructions  Additional time then spent on discharge activities  Discharge Medications:  See after visit summary for reconciled discharge medications provided to patient and family        ** Please Note: This note has been constructed using a voice recognition system **

## 2019-07-21 NOTE — ASSESSMENT & PLAN NOTE
· JOSE placement x1 to the proximal LAD May 1, 2019 at outside hospital  · Patient does report compliance with aspirin, Plavix and statin  · Continue on medical management

## 2019-07-22 LAB
ATRIAL RATE: 63 BPM
ATRIAL RATE: 85 BPM
P AXIS: 43 DEGREES
P AXIS: 66 DEGREES
PR INTERVAL: 158 MS
PR INTERVAL: 182 MS
QRS AXIS: 50 DEGREES
QRS AXIS: 66 DEGREES
QRSD INTERVAL: 80 MS
QRSD INTERVAL: 92 MS
QT INTERVAL: 398 MS
QT INTERVAL: 448 MS
QTC INTERVAL: 458 MS
QTC INTERVAL: 473 MS
T WAVE AXIS: 31 DEGREES
T WAVE AXIS: 61 DEGREES
VENTRICULAR RATE: 63 BPM
VENTRICULAR RATE: 85 BPM

## 2019-07-22 PROCEDURE — 93010 ELECTROCARDIOGRAM REPORT: CPT | Performed by: INTERNAL MEDICINE

## 2019-07-26 LAB
ATRIAL RATE: 87 BPM
ATRIAL RATE: 88 BPM
P AXIS: 40 DEGREES
P AXIS: 44 DEGREES
PR INTERVAL: 170 MS
PR INTERVAL: 176 MS
QRS AXIS: 54 DEGREES
QRS AXIS: 56 DEGREES
QRSD INTERVAL: 82 MS
QRSD INTERVAL: 90 MS
QT INTERVAL: 368 MS
QT INTERVAL: 382 MS
QTC INTERVAL: 442 MS
QTC INTERVAL: 462 MS
T WAVE AXIS: 39 DEGREES
T WAVE AXIS: 55 DEGREES
VENTRICULAR RATE: 87 BPM
VENTRICULAR RATE: 88 BPM

## 2019-07-26 PROCEDURE — 93010 ELECTROCARDIOGRAM REPORT: CPT | Performed by: INTERNAL MEDICINE

## 2021-04-18 ENCOUNTER — HOSPITAL ENCOUNTER (EMERGENCY)
Facility: HOSPITAL | Age: 76
Discharge: HOME | End: 2021-04-18
Attending: EMERGENCY MEDICINE | Admitting: EMERGENCY MEDICINE
Payer: COMMERCIAL

## 2021-04-18 ENCOUNTER — APPOINTMENT (EMERGENCY)
Dept: RADIOLOGY | Facility: HOSPITAL | Age: 76
End: 2021-04-18
Payer: COMMERCIAL

## 2021-04-18 ENCOUNTER — APPOINTMENT (EMERGENCY)
Dept: CT IMAGING | Facility: HOSPITAL | Age: 76
End: 2021-04-18
Payer: COMMERCIAL

## 2021-04-18 VITALS
SYSTOLIC BLOOD PRESSURE: 116 MMHG | OXYGEN SATURATION: 94 % | HEIGHT: 64 IN | HEART RATE: 80 BPM | BODY MASS INDEX: 23.05 KG/M2 | DIASTOLIC BLOOD PRESSURE: 58 MMHG | WEIGHT: 135 LBS | RESPIRATION RATE: 20 BRPM | TEMPERATURE: 98.2 F

## 2021-04-18 DIAGNOSIS — R10.9 ABDOMINAL PAIN: Primary | ICD-10-CM

## 2021-04-18 DIAGNOSIS — R93.89 THICKENED ENDOMETRIUM: ICD-10-CM

## 2021-04-18 LAB
ALBUMIN SERPL BCP-MCNC: 3.7 G/DL (ref 3.5–5)
ALP SERPL-CCNC: 79 U/L (ref 46–116)
ALT SERPL W P-5'-P-CCNC: 33 U/L (ref 12–78)
ANION GAP SERPL CALCULATED.3IONS-SCNC: 9 MMOL/L (ref 4–13)
AST SERPL W P-5'-P-CCNC: 26 U/L (ref 5–45)
BASOPHILS # BLD AUTO: 0.04 THOUSANDS/ΜL (ref 0–0.1)
BASOPHILS NFR BLD AUTO: 0 % (ref 0–1)
BILIRUB SERPL-MCNC: 0.4 MG/DL (ref 0.2–1)
BUN SERPL-MCNC: 13 MG/DL (ref 5–25)
CALCIUM SERPL-MCNC: 8.8 MG/DL (ref 8.3–10.1)
CHLORIDE SERPL-SCNC: 104 MMOL/L (ref 100–108)
CO2 SERPL-SCNC: 27 MMOL/L (ref 21–32)
CREAT SERPL-MCNC: 0.73 MG/DL (ref 0.6–1.3)
EOSINOPHIL # BLD AUTO: 0.2 THOUSAND/ΜL (ref 0–0.61)
EOSINOPHIL NFR BLD AUTO: 1 % (ref 0–6)
ERYTHROCYTE [DISTWIDTH] IN BLOOD BY AUTOMATED COUNT: 12.3 % (ref 11.6–15.1)
GFR SERPL CREATININE-BSD FRML MDRD: 81 ML/MIN/1.73SQ M
GLUCOSE SERPL-MCNC: 105 MG/DL (ref 65–140)
HCT VFR BLD AUTO: 38.8 % (ref 34.8–46.1)
HGB BLD-MCNC: 12.8 G/DL (ref 11.5–15.4)
IMM GRANULOCYTES # BLD AUTO: 0.08 THOUSAND/UL (ref 0–0.2)
IMM GRANULOCYTES NFR BLD AUTO: 1 % (ref 0–2)
LIPASE SERPL-CCNC: 143 U/L (ref 73–393)
LYMPHOCYTES # BLD AUTO: 0.76 THOUSANDS/ΜL (ref 0.6–4.47)
LYMPHOCYTES NFR BLD AUTO: 5 % (ref 14–44)
MCH RBC QN AUTO: 31.1 PG (ref 26.8–34.3)
MCHC RBC AUTO-ENTMCNC: 33 G/DL (ref 31.4–37.4)
MCV RBC AUTO: 94 FL (ref 82–98)
MONOCYTES # BLD AUTO: 1.24 THOUSAND/ΜL (ref 0.17–1.22)
MONOCYTES NFR BLD AUTO: 7 % (ref 4–12)
NEUTROPHILS # BLD AUTO: 14.72 THOUSANDS/ΜL (ref 1.85–7.62)
NEUTS SEG NFR BLD AUTO: 86 % (ref 43–75)
NRBC BLD AUTO-RTO: 0 /100 WBCS
PLATELET # BLD AUTO: 195 THOUSANDS/UL (ref 149–390)
PMV BLD AUTO: 10.1 FL (ref 8.9–12.7)
POTASSIUM SERPL-SCNC: 3.8 MMOL/L (ref 3.5–5.3)
PROT SERPL-MCNC: 6.6 G/DL (ref 6.4–8.2)
RBC # BLD AUTO: 4.12 MILLION/UL (ref 3.81–5.12)
SODIUM SERPL-SCNC: 140 MMOL/L (ref 136–145)
TROPONIN I SERPL-MCNC: <0.02 NG/ML
WBC # BLD AUTO: 17.04 THOUSAND/UL (ref 4.31–10.16)

## 2021-04-18 PROCEDURE — 84484 ASSAY OF TROPONIN QUANT: CPT | Performed by: EMERGENCY MEDICINE

## 2021-04-18 PROCEDURE — 71045 X-RAY EXAM CHEST 1 VIEW: CPT

## 2021-04-18 PROCEDURE — 96374 THER/PROPH/DIAG INJ IV PUSH: CPT

## 2021-04-18 PROCEDURE — 99285 EMERGENCY DEPT VISIT HI MDM: CPT | Performed by: EMERGENCY MEDICINE

## 2021-04-18 PROCEDURE — 83690 ASSAY OF LIPASE: CPT | Performed by: EMERGENCY MEDICINE

## 2021-04-18 PROCEDURE — 74177 CT ABD & PELVIS W/CONTRAST: CPT

## 2021-04-18 PROCEDURE — 99285 EMERGENCY DEPT VISIT HI MDM: CPT

## 2021-04-18 PROCEDURE — 85025 COMPLETE CBC W/AUTO DIFF WBC: CPT | Performed by: EMERGENCY MEDICINE

## 2021-04-18 PROCEDURE — 80053 COMPREHEN METABOLIC PANEL: CPT | Performed by: EMERGENCY MEDICINE

## 2021-04-18 PROCEDURE — 93005 ELECTROCARDIOGRAM TRACING: CPT

## 2021-04-18 PROCEDURE — 36415 COLL VENOUS BLD VENIPUNCTURE: CPT | Performed by: EMERGENCY MEDICINE

## 2021-04-18 RX ORDER — ONDANSETRON 2 MG/ML
4 INJECTION INTRAMUSCULAR; INTRAVENOUS ONCE
Status: COMPLETED | OUTPATIENT
Start: 2021-04-18 | End: 2021-04-18

## 2021-04-18 RX ORDER — MORPHINE SULFATE 4 MG/ML
4 INJECTION, SOLUTION INTRAMUSCULAR; INTRAVENOUS ONCE
Status: DISCONTINUED | OUTPATIENT
Start: 2021-04-18 | End: 2021-04-18 | Stop reason: HOSPADM

## 2021-04-18 RX ORDER — ROSUVASTATIN CALCIUM 10 MG/1
10 TABLET, COATED ORAL DAILY
COMMUNITY

## 2021-04-18 RX ADMIN — ONDANSETRON 4 MG: 2 INJECTION INTRAMUSCULAR; INTRAVENOUS at 16:12

## 2021-04-18 RX ADMIN — IOHEXOL 100 ML: 350 INJECTION, SOLUTION INTRAVENOUS at 17:29

## 2021-04-18 NOTE — ED PROVIDER NOTES
History  Chief Complaint   Patient presents with    Abdominal Pain     Patient states that she started with sudden severe right lower abdominal pain and now feels nausea and fool       History provided by:  Patient  Abdominal Pain  Pain location:  Epigastric and RUQ  Pain quality: sharp    Pain radiates to:  Does not radiate  Pain severity:  Moderate  Onset quality:  Sudden  Duration:  4 hours  Timing:  Constant  Progression:  Unchanged  Chronicity:  New  Context: not alcohol use and not medication withdrawal    Worsened by:  Nothing  Ineffective treatments:  OTC medications  Associated symptoms: nausea    Associated symptoms: no chest pain, no fever and no vomiting        Prior to Admission Medications   Prescriptions Last Dose Informant Patient Reported? Taking?   aspirin 81 mg chewable tablet   Yes No   Sig: Chew 81 mg daily   levothyroxine 125 mcg tablet   Yes No   Sig: Take 125 mcg by mouth daily   rosuvastatin (Crestor) 10 MG tablet   Yes Yes   Sig: Take 10 mg by mouth daily      Facility-Administered Medications: None       Past Medical History:   Diagnosis Date    Cardiac disease     Disease of thyroid gland     Hyperlipidemia     Hypertension        Past Surgical History:   Procedure Laterality Date    CARDIAC SURGERY      Stent placement       History reviewed  No pertinent family history  I have reviewed and agree with the history as documented  E-Cigarette/Vaping     E-Cigarette/Vaping Substances     Social History     Tobacco Use    Smoking status: Former Smoker    Smokeless tobacco: Never Used   Substance Use Topics    Alcohol use: Not Currently    Drug use: Never       Review of Systems   Constitutional: Negative for fever  Cardiovascular: Negative for chest pain  Gastrointestinal: Positive for abdominal pain and nausea  Negative for vomiting  All other systems reviewed and are negative  Physical Exam  Physical Exam  Vitals signs and nursing note reviewed     Constitutional: Appearance: She is well-developed  HENT:      Head: Normocephalic and atraumatic  Right Ear: External ear normal       Left Ear: External ear normal       Nose: Nose normal    Eyes:      General: No scleral icterus  Neck:      Musculoskeletal: Normal range of motion  Cardiovascular:      Rate and Rhythm: Normal rate  Pulmonary:      Effort: Pulmonary effort is normal  No respiratory distress  Abdominal:      General: There is no distension  Tenderness: There is abdominal tenderness in the right upper quadrant and epigastric area  Musculoskeletal: Normal range of motion  General: No deformity  Skin:     Findings: No rash  Neurological:      General: No focal deficit present  Mental Status: She is alert and oriented to person, place, and time     Psychiatric:         Mood and Affect: Mood normal          Vital Signs  ED Triage Vitals   Temperature Pulse Respirations Blood Pressure SpO2   04/18/21 1557 04/18/21 1558 04/18/21 1558 04/18/21 1558 04/18/21 1558   98 2 °F (36 8 °C) 92 18 143/74 94 %      Temp Source Heart Rate Source Patient Position - Orthostatic VS BP Location FiO2 (%)   04/18/21 1557 -- 04/18/21 1558 04/18/21 1558 --   Temporal  Lying Right arm       Pain Score       04/18/21 1616       3           Vitals:    04/18/21 1558 04/18/21 1826 04/18/21 1830   BP: 143/74  116/58   Pulse: 92 80    Patient Position - Orthostatic VS: Lying           Visual Acuity      ED Medications  Medications   morphine (PF) 4 mg/mL injection 4 mg (0 mg Intravenous Hold 4/18/21 1616)   ondansetron (ZOFRAN) injection 4 mg (4 mg Intravenous Given 4/18/21 1612)   iohexol (OMNIPAQUE) 350 MG/ML injection (SINGLE-DOSE) 100 mL (100 mL Intravenous Given 4/18/21 1729)       Diagnostic Studies  Results Reviewed     Procedure Component Value Units Date/Time    CMP [[id number]] Collected: 04/18/21 1611    Lab Status: Final result Specimen: Blood from Arm, Right Updated: 04/18/21 1701     Sodium 140 mmol/L      Potassium 3 8 mmol/L      Chloride 104 mmol/L      CO2 27 mmol/L      ANION GAP 9 mmol/L      BUN 13 mg/dL      Creatinine 0 73 mg/dL      Glucose 105 mg/dL      Calcium 8 8 mg/dL      AST 26 U/L      ALT 33 U/L      Alkaline Phosphatase 79 U/L      Total Protein 6 6 g/dL      Albumin 3 7 g/dL      Total Bilirubin 0 40 mg/dL      eGFR 81 ml/min/1 73sq m     Narrative:      National Kidney Disease Foundation guidelines for Chronic Kidney Disease (CKD):     Stage 1 with normal or high GFR (GFR > 90 mL/min/1 73 square meters)    Stage 2 Mild CKD (GFR = 60-89 mL/min/1 73 square meters)    Stage 3A Moderate CKD (GFR = 45-59 mL/min/1 73 square meters)    Stage 3B Moderate CKD (GFR = 30-44 mL/min/1 73 square meters)    Stage 4 Severe CKD (GFR = 15-29 mL/min/1 73 square meters)    Stage 5 End Stage CKD (GFR <15 mL/min/1 73 square meters)  Note: GFR calculation is accurate only with a steady state creatinine    Lipase [[id number]]  (Normal) Collected: 04/18/21 1611    Lab Status: Final result Specimen: Blood from Arm, Right Updated: 04/18/21 1701     Lipase 143 u/L     Troponin I [[id number]]  (Normal) Collected: 04/18/21 1611    Lab Status: Final result Specimen: Blood from Arm, Right Updated: 04/18/21 1646     Troponin I <0 02 ng/mL     CBC and differential [[id number]]  (Abnormal) Collected: 04/18/21 1611    Lab Status: Final result Specimen: Blood from Arm, Right Updated: 04/18/21 1622     WBC 17 04 Thousand/uL      RBC 4 12 Million/uL      Hemoglobin 12 8 g/dL      Hematocrit 38 8 %      MCV 94 fL      MCH 31 1 pg      MCHC 33 0 g/dL      RDW 12 3 %      MPV 10 1 fL      Platelets 005 Thousands/uL      nRBC 0 /100 WBCs      Neutrophils Relative 86 %      Immat GRANS % 1 %      Lymphocytes Relative 5 %      Monocytes Relative 7 %      Eosinophils Relative 1 %      Basophils Relative 0 %      Neutrophils Absolute 14 72 Thousands/µL      Immature Grans Absolute 0 08 Thousand/uL      Lymphocytes Absolute 0 76 Thousands/µL      Monocytes Absolute 1 24 Thousand/µL      Eosinophils Absolute 0 20 Thousand/µL      Basophils Absolute 0 04 Thousands/µL                  CT Abdomen pelvis with contrast   Final Result by Roddy Holstein, MD (04/18 1745)      No acute inflammatory findings in the abdomen or the pelvis  Endometrial thickness of 9 mm is abnormal in a patient of this age  Consider nonurgent gynecological consultation  The study was marked in [facility] for immediate notification  Workstation performed: [id number]         XR chest 1 view portable   ED Interpretation by Leilani Olivas DO (04/18 1756)   No acute cardiopulm disease                 Procedures  Procedures         ED Course                             SBIRT 22yo+      Most Recent Value   SBIRT (23 yo +)   In order to provide better care to our patients, we are screening all of our patients for alcohol and drug use  Would it be okay to ask you these screening questions? Yes Filed at: 04/18/2021 1623   Initial Alcohol Screen: US AUDIT-C    3b  FEMALE Any Age, or MALE 65+: How often do you have 4 or more drinks on one occassion? 0 Filed at: 04/18/2021 1623   Audit-C Score  0 Filed at: 04/18/2021 1623   RAJIV: How many times in the past year have you    Used an illegal drug or used a prescription medication for non-medical reasons? Never Filed at: 04/18/2021 1623                    MDM  Number of Diagnoses or Management Options  Abdominal pain: new and requires workup  Thickened endometrium: new and requires workup  Diagnosis management comments: 77 yof abd pain  Discussed all results with patient and importance of close FU with gyn regarding endometrial thickening  Patient has complete resolution of symptoms without any pain meds administered          Amount and/or Complexity of Data Reviewed  Clinical lab tests: ordered  Tests in the radiology section of CPT®: ordered  Tests in the medicine section of CPT®: ordered  Review and summarize past medical records: yes  Independent visualization of images, tracings, or specimens: yes    Risk of Complications, Morbidity, and/or Mortality  Presenting problems: moderate  Diagnostic procedures: moderate  Management options: moderate    Patient Progress  Patient progress: stable      Disposition  Final diagnoses:   Abdominal pain   Thickened endometrium     Time reflects when diagnosis was documented in both MDM as applicable and the Disposition within this note     Time User Action Codes Description Comment    4/18/2021  4:12 PM Ratna Anna Add [R10 9] Abdominal pain     4/18/2021  5:57 PM Ratna Anna Add [R93 89] Thickened endometrium       ED Disposition     ED Disposition Condition Date/Time Comment    Discharge Stable Sun Apr 18, 2021  5:56 PM [facility] discharge to home/self care              Follow-up Information     Follow up With Specialties Details Why Contact Info Additional [address] Obstetrics and Gynecology In 2 days For further evaluation of endometrial thickening [address] [facility] [id number]  [address] [place] [address] [address] [place], [place], [address]     [facility] Internal Medicine   [facility]  [address]  [id number] [address] [place] [address] 178 Emergency Department Emergency Medicine  If symptoms worsen [address] [address]  [address] [facility] Emergency Department, [address] [facility] ER [address]          Discharge Medication List as of 4/18/2021  5:58 PM      CONTINUE these medications which have NOT CHANGED    Details   rosuvastatin (Crestor) 10 MG tablet Take 10 mg by mouth daily, Historical Med      aspirin 81 mg chewable tablet Chew 81 mg daily, Historical Med      levothyroxine 125 mcg tablet Take 125 mcg by mouth daily, Historical Med           No discharge procedures on file      PDMP Review     None          ED Provider  Electronically Signed by           Ede Erickson DO  04/18/21 3810

## 2021-04-19 LAB
ATRIAL RATE: 89 BPM
P AXIS: 64 DEGREES
PR INTERVAL: 154 MS
QRS AXIS: 36 DEGREES
QRSD INTERVAL: 84 MS
QT INTERVAL: 378 MS
QTC INTERVAL: 459 MS
T WAVE AXIS: 46 DEGREES
VENTRICULAR RATE: 89 BPM

## 2021-04-19 PROCEDURE — 93010 ELECTROCARDIOGRAM REPORT: CPT | Performed by: INTERNAL MEDICINE
